# Patient Record
Sex: MALE | Race: WHITE | NOT HISPANIC OR LATINO | Employment: FULL TIME | URBAN - METROPOLITAN AREA
[De-identification: names, ages, dates, MRNs, and addresses within clinical notes are randomized per-mention and may not be internally consistent; named-entity substitution may affect disease eponyms.]

---

## 2019-03-11 ENCOUNTER — TELEPHONE (OUTPATIENT)
Dept: OPHTHALMOLOGY | Facility: CLINIC | Age: 24
End: 2019-03-11

## 2019-03-11 NOTE — TELEPHONE ENCOUNTER
----- Message from Terese Blas sent at 3/11/2019  4:28 PM CDT -----  Contact: Zander Akhtar calling to confirm if we received his office notes.  They are not in the phone room.  Perhaps they were picked up already.  Not sure.  Patient wants us to call him tomorrow to let him know if we have them or not.  [phone]

## 2019-03-13 ENCOUNTER — TELEPHONE (OUTPATIENT)
Dept: OPHTHALMOLOGY | Facility: CLINIC | Age: 24
End: 2019-03-13

## 2019-03-13 NOTE — TELEPHONE ENCOUNTER
Spoke to Marino, he wanted to know if we received his  referral.  I have not received it yet and gave an email he could send it to instead

## 2019-03-14 ENCOUNTER — TELEPHONE (OUTPATIENT)
Dept: OPHTHALMOLOGY | Facility: CLINIC | Age: 24
End: 2019-03-14

## 2019-03-14 ENCOUNTER — OFFICE VISIT (OUTPATIENT)
Dept: OPTOMETRY | Facility: CLINIC | Age: 24
End: 2019-03-14
Payer: OTHER GOVERNMENT

## 2019-03-14 ENCOUNTER — INITIAL CONSULT (OUTPATIENT)
Dept: OPHTHALMOLOGY | Facility: CLINIC | Age: 24
End: 2019-03-14
Payer: OTHER GOVERNMENT

## 2019-03-14 DIAGNOSIS — Z98.890 HISTORY OF STRABISMUS SURGERY: ICD-10-CM

## 2019-03-14 DIAGNOSIS — H52.203 HYPEROPIA WITH ASTIGMATISM, BILATERAL: Primary | ICD-10-CM

## 2019-03-14 DIAGNOSIS — H50.43 ACCOMMODATIVE ESOTROPIA: Primary | ICD-10-CM

## 2019-03-14 DIAGNOSIS — Z46.0 FITTING AND ADJUSTMENT OF SPECTACLES AND CONTACT LENSES: ICD-10-CM

## 2019-03-14 DIAGNOSIS — H52.03 HYPEROPIA WITH ASTIGMATISM, BILATERAL: Primary | ICD-10-CM

## 2019-03-14 PROCEDURE — 92310 CONTACT LENS FITTING OU: CPT | Mod: ,,, | Performed by: OPTOMETRIST

## 2019-03-14 PROCEDURE — 92060 SENSORIMOTOR EXAMINATION: CPT | Mod: PBBFAC | Performed by: OPHTHALMOLOGY

## 2019-03-14 PROCEDURE — 92310 PR CONTACT LENS FITTING (NO CHANGE): ICD-10-PCS | Mod: ,,, | Performed by: OPTOMETRIST

## 2019-03-14 PROCEDURE — 92004 PR EYE EXAM, NEW PATIENT,COMPREHESV: ICD-10-PCS | Mod: S$PBB,,, | Performed by: OPHTHALMOLOGY

## 2019-03-14 PROCEDURE — 99999 PR PBB SHADOW E&M-EST. PATIENT-LVL II: ICD-10-PCS | Mod: PBBFAC,,, | Performed by: OPHTHALMOLOGY

## 2019-03-14 PROCEDURE — 99212 OFFICE O/P EST SF 10 MIN: CPT | Mod: PBBFAC,27,25 | Performed by: OPTOMETRIST

## 2019-03-14 PROCEDURE — 92060 SENSORIMOTOR EXAMINATION: CPT | Mod: 26,S$PBB,, | Performed by: OPHTHALMOLOGY

## 2019-03-14 PROCEDURE — 99212 OFFICE O/P EST SF 10 MIN: CPT | Mod: PBBFAC | Performed by: OPHTHALMOLOGY

## 2019-03-14 PROCEDURE — 99999 PR PBB SHADOW E&M-EST. PATIENT-LVL II: CPT | Mod: PBBFAC,,, | Performed by: OPTOMETRIST

## 2019-03-14 PROCEDURE — 99999 PR PBB SHADOW E&M-EST. PATIENT-LVL II: CPT | Mod: PBBFAC,,, | Performed by: OPHTHALMOLOGY

## 2019-03-14 PROCEDURE — 92060 PR SPECIAL EYE EVAL,SENSORIMOTOR: ICD-10-PCS | Mod: 26,S$PBB,, | Performed by: OPHTHALMOLOGY

## 2019-03-14 PROCEDURE — 92004 COMPRE OPH EXAM NEW PT 1/>: CPT | Mod: S$PBB,,, | Performed by: OPHTHALMOLOGY

## 2019-03-14 PROCEDURE — 99999 PR PBB SHADOW E&M-EST. PATIENT-LVL II: ICD-10-PCS | Mod: PBBFAC,,, | Performed by: OPTOMETRIST

## 2019-03-14 NOTE — LETTER
March 14, 2019      PRIYA Dunham Jr., MD  [address]  [facility] [address]           [facility]  [address]  [facility] [address]  Phone: [phone]  Fax: [phone]          Patient: Zander Morales   MR Number: [id number]   YOB: 1995   Date of Visit: 3/14/2019       Dear Dr. PRIYA Dunham Jr.:    Thank you for referring Zander Morales to me for evaluation. Attached you will find relevant portions of my assessment and plan of care.    If you have questions, please do not hesitate to call me. I look forward to following Zander Morales along with you.    Sincerely,    Matthew Alvarado, OD    Enclosure  CC:  No Recipients    If you would like to receive this communication electronically, please contact [email or web address] or [phone] to request more information on [employer] Link access.    For providers and/or their staff who would like to refer a patient to [facility], please contact us through our one-stop-shop provider referral line, [facility], at [phone].    If you feel you have received this communication in error or would no longer like to receive these types of communications, please e-mail [email or web address]

## 2019-03-14 NOTE — PROGRESS NOTES
HPI     Patient is here for contact fit O U previous wearer.  Unsure of brand of previous lens.  strabismus OD     Last edited by Eddie Cervantes, PCT on 3/14/2019  9:34 AM. (History)        ROS     Negative for: Constitutional, Gastrointestinal, Neurological, Skin,   Genitourinary, Musculoskeletal, HENT, Endocrine, Cardiovascular, Eyes,   Respiratory, Psychiatric, Allergic/Imm, Heme/Lymph    Last edited by Matthew Alvarado, OD on 3/14/2019  9:35 AM. (History)        Assessment /Plan     For exam results, see Encounter Report.    Hyperopia with astigmatism, bilateral    Order CL. RTC CLFU at dispense.                 Contact lens exam done, see exam of same date for documentation.

## 2019-03-14 NOTE — PROGRESS NOTES
HPI     23 y.o old edouard is here for strabismus evaluation OD.  Strabismus Surgery OD 2001.  Patient sts crossing of eyes when removing glasses, OD is turning outward.    History of Recess R MR 5.5mm 3/27/02 with Dr. Ruelas    Last edited by PRIYA Dunham Jr., MD on 3/14/2019  8:29 AM. (History)              Assessment /Plan     For exam results, see Encounter Report.    Accommodative esotropia      Residual ET with glasses wear  In full correction, continue same specs   Can consider contact lens wear     Consider surgical correction to correct refractive error and Esotropia. The details of the surgical procedure were discussed. The risks of the procedure were identified and explained. Treatment alternatives were listed.      Procedure plan would be PRK and MR recession left eye.  Use of an adjustable suture to ensure a better outcome. 95% success rate with a 5% chance need for a repeat.  History of previous strabismus surgery to the right eye.     Edouard has decided to have a contact lens fit and then return for repeat measurements, will schedule strabismus surgery to correct the residual ET     This service was scribed by Myesha De Oliveira for, and in the presence of Dr Dunham who personally performed this service.    Myesha De Oliveira, COA    Arnulfo Dunham MD

## 2019-03-28 ENCOUNTER — TELEPHONE (OUTPATIENT)
Dept: OPTOMETRY | Facility: CLINIC | Age: 24
End: 2019-03-28

## 2019-03-28 NOTE — TELEPHONE ENCOUNTER
----- Message from Adis LOZA Stinger sent at 3/28/2019  8:48 AM CDT -----  Hello,    We have just received trial lenses for this pt.  Please call this pt to schedule a CLFU@P/U.    Thanks,  Esperanza

## 2019-04-02 ENCOUNTER — TELEPHONE (OUTPATIENT)
Dept: OPHTHALMOLOGY | Facility: CLINIC | Age: 24
End: 2019-04-02

## 2019-04-02 NOTE — TELEPHONE ENCOUNTER
04/02/19  Jamila made another attempt to reach pt and I did speak to pt today regarding his concerns about up-coming Sx and after Sx care answering all of his question about sx, after sx care (which he was told by me that I will be calling the day after Sx to check on him as well as review medication and PO care. Pt is requesting to speak with Myesha regarding some paperwork he made need from her. I sent Myesha a message to call pt regarding letter request. st 1:42p        ----- Message from Daniel Valle sent at 4/1/2019 12:37 PM CDT -----  Mr. Morales would like for Myesha to call him. He has questions about his upcoming surgery. He can be reached at [phone]

## 2019-04-04 ENCOUNTER — OFFICE VISIT (OUTPATIENT)
Dept: OPTOMETRY | Facility: CLINIC | Age: 24
End: 2019-04-04

## 2019-04-04 DIAGNOSIS — H52.03 HYPEROPIA WITH ASTIGMATISM, BILATERAL: Primary | ICD-10-CM

## 2019-04-04 DIAGNOSIS — H52.203 HYPEROPIA WITH ASTIGMATISM, BILATERAL: Primary | ICD-10-CM

## 2019-04-04 PROCEDURE — 92499 PR CONTACT LENS F/U LEV 1: ICD-10-PCS | Mod: ,,, | Performed by: OPTOMETRIST

## 2019-04-04 PROCEDURE — 92499 UNLISTED OPH SVC/PROCEDURE: CPT | Mod: ,,, | Performed by: OPTOMETRIST

## 2019-04-05 ENCOUNTER — TELEPHONE (OUTPATIENT)
Dept: OPHTHALMOLOGY | Facility: CLINIC | Age: 24
End: 2019-04-05

## 2019-04-05 NOTE — PROGRESS NOTES
HPI     Pt here to pickup CL and have f/u    Last edited by Matthew Alvarado, OD on 4/4/2019  4:43 PM. (History)        ROS     Negative for: Constitutional, Gastrointestinal, Neurological, Skin,   Genitourinary, Musculoskeletal, HENT, Endocrine, Cardiovascular, Eyes,   Respiratory, Psychiatric, Allergic/Imm, Heme/Lymph    Last edited by Matthew Alvarado, OD on 4/4/2019  4:43 PM. (History)        Assessment /Plan     For exam results, see Encounter Report.    Hyperopia with astigmatism, bilateral    New CLRx ordered. Pt has concerns about needing lenses before next week for his strab surgery workup with Dr Dunham. Pt informed that unfortunately, the lenses take 4 or more weeks to come in due to high cyl OD. Pt shows understanding. RTC [facility]FU at [facility]

## 2019-04-05 NOTE — TELEPHONE ENCOUNTER
04/05/19  Jamila returned Mr. Pichardo call and I have reviewed pt chart and I explained with pt that his ctl Rx will be fine and to keep his appt. stj 4:06p      ----- Message from Christel Beyer sent at 4/5/2019  2:25 PM CDT -----  Contact: Zander  Needs Advice    Reason for call: pt needed to speak with someone in the office about his contact lens.         Communication Preference: [phone]     Additional Information:

## 2019-04-16 NOTE — BRIEF OP NOTE
Brief Operative Note  Ophthalmology Service      Date of Procedure: 4/22/19     Attending Physician: PRIYA Dunham Jr., MD     Assistant: DENIA Hale MD    Pre-Operative Diagnosis: Accommodative esotropia [H50.43]     Post-Operative Diagnosis: Same as pre-operative diagnosis    Treatments/Procedures: Recess MR  To 13 mm from limbus w/adj OU    Intraoperative Findings: MR OD 10 mm from limbus; MR OS 8.0 mm from limbus    Anesthesia: General    Complications: None    Estimated Blood Loss: < 5 cc    Specimens: None    -------------------------------------------------------------  Full dictated Operative Report to follow.  -------------------------------------------------------------

## 2019-04-17 ENCOUNTER — TELEPHONE (OUTPATIENT)
Dept: OPTOMETRY | Facility: CLINIC | Age: 24
End: 2019-04-17

## 2019-04-18 ENCOUNTER — OFFICE VISIT (OUTPATIENT)
Dept: OPHTHALMOLOGY | Facility: CLINIC | Age: 24
End: 2019-04-18
Payer: OTHER GOVERNMENT

## 2019-04-18 DIAGNOSIS — Z98.890 HISTORY OF STRABISMUS SURGERY: Primary | ICD-10-CM

## 2019-04-18 DIAGNOSIS — H50.43 PARTIALLY ACCOMMODATIVE ESOTROPIA: ICD-10-CM

## 2019-04-18 PROCEDURE — 99999 PR PBB SHADOW E&M-EST. PATIENT-LVL II: ICD-10-PCS | Mod: PBBFAC,,, | Performed by: OPHTHALMOLOGY

## 2019-04-18 PROCEDURE — 92060 SENSORIMOTOR EXAMINATION: CPT | Mod: 26,S$PBB,, | Performed by: OPHTHALMOLOGY

## 2019-04-18 PROCEDURE — 92012 PR EYE EXAM, EST PATIENT,INTERMED: ICD-10-PCS | Mod: S$PBB,,, | Performed by: OPHTHALMOLOGY

## 2019-04-18 PROCEDURE — 92060 PR SPECIAL EYE EVAL,SENSORIMOTOR: ICD-10-PCS | Mod: 26,S$PBB,, | Performed by: OPHTHALMOLOGY

## 2019-04-18 PROCEDURE — 92060 SENSORIMOTOR EXAMINATION: CPT | Mod: PBBFAC | Performed by: OPHTHALMOLOGY

## 2019-04-18 PROCEDURE — 99999 PR PBB SHADOW E&M-EST. PATIENT-LVL II: CPT | Mod: PBBFAC,,, | Performed by: OPHTHALMOLOGY

## 2019-04-18 PROCEDURE — 92012 INTRM OPH EXAM EST PATIENT: CPT | Mod: S$PBB,,, | Performed by: OPHTHALMOLOGY

## 2019-04-18 PROCEDURE — 99212 OFFICE O/P EST SF 10 MIN: CPT | Mod: PBBFAC | Performed by: OPHTHALMOLOGY

## 2019-04-18 NOTE — PROGRESS NOTES
HPI     Patient is here for Pre-op visit , pt is wearing contact lens, not the   correct RX due to special RX.  (-)Flashes (-)Floaters  (-)Itch, (-)tear, (--)burn, (--)Dryness. (-) OTC Drops   (--)Photophobia  (-)Glare (-)diplopia (-) headaches          Last edited by ROSE Muller on 4/18/2019  8:30 AM. (History)            Assessment /Plan     For exam results, see Encounter Report.    History of strabismus surgery    Partially accommodative esotropia      Residual ET with glasses and contact lens    Ready for surgery on 4/22/19; target 35 ET  Advised to bring glasses to surgery     This service was scribed by Myesha De Oliveira for, and in the presence of Dr Dunham who personally performed this service.    Myesha De Oliveira, COA    Arnulfo Dunham MD

## 2019-04-19 NOTE — BRIEF OP NOTE
Discharge Summary  Ophthalmology Service      Admit Date: 4/22/19    Discharge Date: 4/22/19    Attending Physician: PRIYA Dunham Jr., MD     Discharge Physician: JUNI Dunham MD    Discharged Condition: Good    Reason for Admission: Accommodative esotropia [H50.43]     Treatments/Procedures: Strabismus Surgery (see dictated report for details).    Hospital Course: Stable, dictated    Consults: None    Significant Diagnostic Studies: None    Disposition: Home    Patient Instructions:   - Resume same diet as prior to surgery  - Resume activity as tolerated with no swimming for 1 week  - Apply ice packs to surgical eye(s) for 72 hours as tolerated  - Call the Ophthalmology clinic to schedule an appointment with Dr. Dunham in 4 week(s).    Patient Instructions:   Patient's Medications    No medications on file       No discharge procedures on file.

## 2019-04-22 ENCOUNTER — TELEPHONE (OUTPATIENT)
Dept: OPTOMETRY | Facility: CLINIC | Age: 24
End: 2019-04-22

## 2019-04-22 ENCOUNTER — HOSPITAL ENCOUNTER (OUTPATIENT)
Facility: HOSPITAL | Age: 24
Discharge: HOME | End: 2019-04-22
Attending: OPHTHALMOLOGY | Admitting: OPHTHALMOLOGY
Payer: OTHER GOVERNMENT

## 2019-04-22 ENCOUNTER — ANESTHESIA EVENT (OUTPATIENT)
Dept: SURGERY | Facility: HOSPITAL | Age: 24
End: 2019-04-22
Payer: OTHER GOVERNMENT

## 2019-04-22 ENCOUNTER — ANESTHESIA (OUTPATIENT)
Dept: SURGERY | Facility: HOSPITAL | Age: 24
End: 2019-04-22
Payer: OTHER GOVERNMENT

## 2019-04-22 VITALS
HEIGHT: 67 IN | HEART RATE: 71 BPM | TEMPERATURE: 98 F | DIASTOLIC BLOOD PRESSURE: 73 MMHG | WEIGHT: 155 LBS | OXYGEN SATURATION: 99 % | BODY MASS INDEX: 24.33 KG/M2 | SYSTOLIC BLOOD PRESSURE: 128 MMHG | RESPIRATION RATE: 28 BRPM

## 2019-04-22 DIAGNOSIS — H50.9 STRABISMUS: ICD-10-CM

## 2019-04-22 DIAGNOSIS — H50.43 PARTIALLY ACCOMMODATIVE ESOTROPIA: Primary | ICD-10-CM

## 2019-04-22 PROCEDURE — 37000008 HC ANESTHESIA 1ST 15 MINUTES: Performed by: OPHTHALMOLOGY

## 2019-04-22 PROCEDURE — 36000707: Performed by: OPHTHALMOLOGY

## 2019-04-22 PROCEDURE — D9220A PRA ANESTHESIA: ICD-10-PCS | Mod: ANES,,, | Performed by: ANESTHESIOLOGY

## 2019-04-22 PROCEDURE — D9220A PRA ANESTHESIA: Mod: ANES,,, | Performed by: ANESTHESIOLOGY

## 2019-04-22 PROCEDURE — 37000009 HC ANESTHESIA EA ADD 15 MINS: Performed by: OPHTHALMOLOGY

## 2019-04-22 PROCEDURE — 71000044 HC DOSC ROUTINE RECOVERY FIRST HOUR: Performed by: OPHTHALMOLOGY

## 2019-04-22 PROCEDURE — D9220A PRA ANESTHESIA: ICD-10-PCS | Mod: CRNA,,, | Performed by: NURSE ANESTHETIST, CERTIFIED REGISTERED

## 2019-04-22 PROCEDURE — 00140 ANES PROCEDURES ON EYE NOS: CPT | Performed by: OPHTHALMOLOGY

## 2019-04-22 PROCEDURE — 71000015 HC POSTOP RECOV 1ST HR: Performed by: OPHTHALMOLOGY

## 2019-04-22 PROCEDURE — 67332 REREVISE EYE MUSCLES ADD-ON: CPT | Mod: 50,,, | Performed by: OPHTHALMOLOGY

## 2019-04-22 PROCEDURE — 67311 PR STABISMUS SURG,ONE HORIZ MUSCLE: ICD-10-PCS | Mod: 50,,, | Performed by: OPHTHALMOLOGY

## 2019-04-22 PROCEDURE — 25000003 PHARM REV CODE 250: Performed by: OPHTHALMOLOGY

## 2019-04-22 PROCEDURE — 67334: ICD-10-PCS | Mod: 50,,, | Performed by: OPHTHALMOLOGY

## 2019-04-22 PROCEDURE — 63600175 PHARM REV CODE 636 W HCPCS: Performed by: ANESTHESIOLOGY

## 2019-04-22 PROCEDURE — 63600175 PHARM REV CODE 636 W HCPCS: Performed by: NURSE ANESTHETIST, CERTIFIED REGISTERED

## 2019-04-22 PROCEDURE — 36000706: Performed by: OPHTHALMOLOGY

## 2019-04-22 PROCEDURE — 67334 REVISE EYE MUSCLE W/SUTURE: CPT | Mod: 50,,, | Performed by: OPHTHALMOLOGY

## 2019-04-22 PROCEDURE — 67311 REVISE EYE MUSCLE: CPT | Mod: 50,,, | Performed by: OPHTHALMOLOGY

## 2019-04-22 PROCEDURE — D9220A PRA ANESTHESIA: Mod: CRNA,,, | Performed by: NURSE ANESTHETIST, CERTIFIED REGISTERED

## 2019-04-22 PROCEDURE — 67332 PR STABISMUS SURG,SCAR EXTRAOCUL MUSC: ICD-10-PCS | Mod: 50,,, | Performed by: OPHTHALMOLOGY

## 2019-04-22 RX ORDER — SODIUM CHLORIDE 0.9 % (FLUSH) 0.9 %
10 SYRINGE (ML) INJECTION
Status: DISCONTINUED | OUTPATIENT
Start: 2019-04-22 | End: 2019-04-22 | Stop reason: HOSPADM

## 2019-04-22 RX ORDER — NEOMYCIN SULFATE, POLYMYXIN B SULFATE, AND DEXAMETHASONE 3.5; 10000; 1 MG/G; [USP'U]/G; MG/G
OINTMENT OPHTHALMIC
Status: DISCONTINUED
Start: 2019-04-22 | End: 2019-04-22 | Stop reason: HOSPADM

## 2019-04-22 RX ORDER — MIDAZOLAM HYDROCHLORIDE 1 MG/ML
INJECTION, SOLUTION INTRAMUSCULAR; INTRAVENOUS
Status: DISCONTINUED | OUTPATIENT
Start: 2019-04-22 | End: 2019-04-22

## 2019-04-22 RX ORDER — SODIUM CHLORIDE 9 MG/ML
INJECTION, SOLUTION INTRAVENOUS CONTINUOUS
Status: DISCONTINUED | OUTPATIENT
Start: 2019-04-22 | End: 2019-04-22 | Stop reason: HOSPADM

## 2019-04-22 RX ORDER — PROPOFOL 10 MG/ML
VIAL (ML) INTRAVENOUS
Status: DISCONTINUED | OUTPATIENT
Start: 2019-04-22 | End: 2019-04-22

## 2019-04-22 RX ORDER — PHENYLEPHRINE HYDROCHLORIDE 25 MG/ML
SOLUTION/ DROPS OPHTHALMIC
Status: DISCONTINUED | OUTPATIENT
Start: 2019-04-22 | End: 2019-04-22 | Stop reason: HOSPADM

## 2019-04-22 RX ORDER — FENTANYL CITRATE 50 UG/ML
25 INJECTION, SOLUTION INTRAMUSCULAR; INTRAVENOUS EVERY 5 MIN PRN
Status: DISCONTINUED | OUTPATIENT
Start: 2019-04-22 | End: 2019-04-22 | Stop reason: HOSPADM

## 2019-04-22 RX ORDER — HYDROCODONE BITARTRATE AND ACETAMINOPHEN 5; 325 MG/1; MG/1
1 TABLET ORAL EVERY 4 HOURS PRN
Status: DISCONTINUED | OUTPATIENT
Start: 2019-04-22 | End: 2019-04-22 | Stop reason: HOSPADM

## 2019-04-22 RX ORDER — DEXAMETHASONE SODIUM PHOSPHATE 4 MG/ML
INJECTION, SOLUTION INTRA-ARTICULAR; INTRALESIONAL; INTRAMUSCULAR; INTRAVENOUS; SOFT TISSUE
Status: DISCONTINUED | OUTPATIENT
Start: 2019-04-22 | End: 2019-04-22

## 2019-04-22 RX ORDER — PHENYLEPHRINE HYDROCHLORIDE 25 MG/ML
SOLUTION/ DROPS OPHTHALMIC
Status: DISCONTINUED
Start: 2019-04-22 | End: 2019-04-22 | Stop reason: HOSPADM

## 2019-04-22 RX ORDER — ONDANSETRON 2 MG/ML
4 INJECTION INTRAMUSCULAR; INTRAVENOUS DAILY PRN
Status: DISCONTINUED | OUTPATIENT
Start: 2019-04-22 | End: 2019-04-22 | Stop reason: HOSPADM

## 2019-04-22 RX ORDER — NEOMYCIN SULFATE, POLYMYXIN B SULFATE, AND DEXAMETHASONE 3.5; 10000; 1 MG/G; [USP'U]/G; MG/G
OINTMENT OPHTHALMIC
Status: DISCONTINUED | OUTPATIENT
Start: 2019-04-22 | End: 2019-04-22 | Stop reason: HOSPADM

## 2019-04-22 RX ORDER — LIDOCAINE HCL/PF 100 MG/5ML
SYRINGE (ML) INTRAVENOUS
Status: DISCONTINUED | OUTPATIENT
Start: 2019-04-22 | End: 2019-04-22

## 2019-04-22 RX ORDER — ONDANSETRON 2 MG/ML
INJECTION INTRAMUSCULAR; INTRAVENOUS
Status: DISCONTINUED | OUTPATIENT
Start: 2019-04-22 | End: 2019-04-22

## 2019-04-22 RX ORDER — LIDOCAINE HYDROCHLORIDE 10 MG/ML
1 INJECTION, SOLUTION EPIDURAL; INFILTRATION; INTRACAUDAL; PERINEURAL ONCE
Status: DISCONTINUED | OUTPATIENT
Start: 2019-04-22 | End: 2019-04-22 | Stop reason: HOSPADM

## 2019-04-22 RX ADMIN — LIDOCAINE HYDROCHLORIDE 100 MG: 20 INJECTION, SOLUTION INTRAVENOUS at 09:04

## 2019-04-22 RX ADMIN — FENTANYL CITRATE 25 MCG: 50 INJECTION INTRAMUSCULAR; INTRAVENOUS at 11:04

## 2019-04-22 RX ADMIN — PROPOFOL 200 MG: 10 INJECTION, EMULSION INTRAVENOUS at 09:04

## 2019-04-22 RX ADMIN — SODIUM CHLORIDE: 0.9 INJECTION, SOLUTION INTRAVENOUS at 09:04

## 2019-04-22 RX ADMIN — PROPOFOL 50 MG: 10 INJECTION, EMULSION INTRAVENOUS at 09:04

## 2019-04-22 RX ADMIN — MIDAZOLAM HYDROCHLORIDE 2 MG: 1 INJECTION, SOLUTION INTRAMUSCULAR; INTRAVENOUS at 09:04

## 2019-04-22 RX ADMIN — DEXAMETHASONE SODIUM PHOSPHATE 4 MG: 4 INJECTION, SOLUTION INTRAMUSCULAR; INTRAVENOUS at 09:04

## 2019-04-22 RX ADMIN — ONDANSETRON 4 MG: 2 INJECTION INTRAMUSCULAR; INTRAVENOUS at 09:04

## 2019-04-22 RX ADMIN — HYDROCODONE BITARTRATE AND ACETAMINOPHEN 1 TABLET: 5; 325 TABLET ORAL at 11:04

## 2019-04-22 NOTE — PLAN OF CARE
Patient awake and alert. No complaints of pain . No complaints of nausea. Patient tolerated clear liquids.  Patient given and explained discharge instructions. Patient ready for discharge.

## 2019-04-22 NOTE — ANESTHESIA PREPROCEDURE EVALUATION
04/22/2019  Zander Morales is a 23 y.o., male.    Anesthesia Evaluation    I have reviewed the Patient Summary Reports.    I have reviewed the Nursing Notes.   I have reviewed the Medications.     Review of Systems  Anesthesia Hx:  No problems with previous Anesthesia  History of prior surgery of interest to airway management or planning: Denies Family Hx of Anesthesia complications.   Denies Personal Hx of Anesthesia complications.   Social:  Non-Smoker    Hematology/Oncology:  Hematology Normal   Oncology Normal     EENT/Dental:EENT/Dental Normal   Cardiovascular:  Cardiovascular Normal     Pulmonary:  Pulmonary Normal    Renal/:  Renal/ Normal     Hepatic/GI:  Hepatic/GI Normal    Musculoskeletal:  Musculoskeletal Normal    Neurological:  Neurology Normal    Endocrine:  Endocrine Normal    Psych:  Psychiatric Normal           Physical Exam  General:  Well nourished    Airway/Jaw/Neck:  Airway Findings: Mouth Opening: Normal Tongue: Normal  General Airway Assessment: Adult  Mallampati: I  TM Distance: Normal, at least 6 cm  Jaw/Neck Findings:  Neck ROM: Normal ROM      Dental:  Dental Findings: In tact   Chest/Lungs:  Chest/Lungs Findings: Clear to auscultation, Normal Respiratory Rate     Heart/Vascular:  Heart Findings: Rate: Normal  Rhythm: Regular Rhythm  Sounds: Normal        Mental Status:  Mental Status Findings:  Cooperative, Alert and Oriented         Anesthesia Plan  Type of Anesthesia, risks & benefits discussed:  Anesthesia Type:  general  Patient's Preference:   Intra-op Monitoring Plan: standard ASA monitors  Intra-op Monitoring Plan Comments:   Post Op Pain Control Plan: multimodal analgesia  Post Op Pain Control Plan Comments:   Induction:   IV  Beta Blocker:  Patient is not currently on a Beta-Blocker (No further documentation required).       Informed Consent: Patient understands  risks and agrees with Anesthesia plan.  Questions answered. Anesthesia consent signed with patient.  ASA Score: 1     Day of Surgery Review of History & Physical:    H&P update referred to the surgeon.         Ready For Surgery From Anesthesia Perspective.

## 2019-04-22 NOTE — DISCHARGE INSTRUCTIONS
Strabismus Surgery    The eye doctor may recommend strabismus surgery to help align your childs eyes. During surgery, certain eye muscles are adjusted. This helps the muscles better control how the eye moves. Often, surgery is done in addition to other treatments. In most cases, children who have strabismus surgery go home the same day.  How surgery works  Strabismus surgery is a safe, common procedure. The eye doctor simply changes the placement or length of an eye muscle. This small change can pull the eye into proper alignment. The 2 most common methods of surgery are:  · Recession. A muscle is moved to a new position on the eye.  · Resection. Part of an eye muscle is removed.  Before surgery  Prepare your child for the procedure as you have been instructed. In addition:  · A few days before surgery, your child may have an eye exam so the doctor can double-check eye measurements.  · Follow any directions your child is given for taking medicines and for not eating or drinking before surgery.  On the day of surgery, your child:  · Can wear favorite pajamas and bring along a toy.  · Will be given medicine (anesthesia) that makes him or her sleepy. Surgery wont start until your child is asleep.  After surgery  Each child reacts to surgery in his or her own way. Some children may be afraid to open their eyes at first. Children are often sleepy or cranky for several hours after surgery. If your childs response worries you, talk to the eye doctor. After surgery your child:  · May have a red eye. This will go away after several weeks.  · Will most likely not need any pain medicine. Recovering from strabismus surgery is not painful for most children.  · May still need other treatment, such as glasses or an eye patch.  When to call the doctor  Call your childs eye doctor if:  · Your childs eyelid is very swollen.  · A pus-like discharge comes from the eye. (A few bloody tears are normal.)  · Your child vomits more  than once.   Also call the doctor if your child has a fever, as directed by his or her healthcare provider, or:  · Your child is younger than 12 weeks and has a fever of 100.4°F (38°C) or higher. Your baby may need to be seen by his or her provider.  · Your child has repeated fevers above 104°F (40°C) at any age.  · Your child is younger than 2 years old and the fever lasts for more than 24 hours.  · Your child is 2 years old or older and the fever lasts for more than 3 days.  · Your child has had a seizure caused by the fever.  Risks and complications  As with any surgery, strabismus surgery has risks. These include:  · The eyes not being perfectly aligned. Some children need more surgery to adjust this.  · Bleeding in or around the eye  · Eye infection  · Risks of anesthesia (the eye doctor can tell you more about these)   Date Last Reviewed: 10/1/2016  © 8378-2913 [employer] [address] [place] [address], [place] [address]. All rights reserved. This information is not intended as a substitute for professional medical care. Always follow your healthcare professional's instructions.

## 2019-04-22 NOTE — DISCHARGE SUMMARY
Discharge Summary  Ophthalmology Service      Admit Date: 4/22/19    Discharge Date: 4/22/19    Attending Physician: PRIYA Dunham Jr., MD     Discharge Physician: JUNI Dunham MD    Discharged Condition: Good    Reason for Admission: Accommodative esotropia [H50.43]  Strabismus [H50.9]     Treatments/Procedures: Strabismus Surgery (see dictated report for details).    Hospital Course: Stable, dictated    Consults: None    Significant Diagnostic Studies: None    Disposition: Home    Patient Instructions:   - Resume same diet as prior to surgery  - Resume activity as tolerated with no swimming for 1 week  - Apply ice packs to surgical eye(s) for 72 hours as tolerated  - Call the Ophthalmology clinic to schedule an appointment with Dr. Dunham in 4 week(s).    Patient Instructions:   There are no discharge medications for this patient.      No discharge procedures on file.

## 2019-04-22 NOTE — H&P
Pre-Operative History & Physical  Ophthalmology      SUBJECTIVE:     History of Present Illness:  Patient is a 23 y.o. male presents with Accommodative esotropia [H50.43].    MEDICATIONS:   No medications prior to admission.       ALLERGIES: Review of patient's allergies indicates:  No Known Allergies    PAST MEDICAL HISTORY:   Past Medical History:   Diagnosis Date    Strabismus      PAST SURGICAL HISTORY:   Past Surgical History:   Procedure Laterality Date    STRABISMUS SURGERY Right 03/27/2002    recess MR 5.5mm     PAST FAMILY HISTORY: No family history on file.  SOCIAL HISTORY:   Social History     Tobacco Use    Smoking status: Never Smoker   Substance Use Topics    Alcohol use: Not on file    Drug use: Not on file        MENTAL STATUS: Alert    REVIEW OF SYSTEMS: Negative    OBJECTIVE:     Vital Signs (Most Recent)       Physical Exam:  General: NAD  HEENT: Strabismus, Atraumatic  Lungs: Adequate respirations  Heart: + pulses  Abdomen: Soft    ASSESSMENT/PLAN:     Patient is a 23 y.o. male with Accommodative esotropia [H50.43].     - Plan for surgical correction Strab sx   - Risks/benefits/alternatives of the procedure including, but not limited to scarring, bleeding, infection, loss or decreased vision, and/or need for possible repeat surgery discussed with the patient and family.   - Informed consent obtained prior to surgery and the patient/family voiced good understanding.

## 2019-04-22 NOTE — OP NOTE
DATE OF PROCEDURE:  04/22/2019.    SURGEON:  PRIYA Dunham M.D.    ASSISTANT:  None.    PREOPERATIVE DIAGNOSIS:  Strabismus -- esotropia; reoperation.    POSTOPERATIVE DIAGNOSIS:  Strabismus -- esotropia; reoperation.    PROCEDURE:  Recession, medial rectus, both eyes to 13 mm from the limbus with   adjustable sutures, both eyes.    COMPLICATIONS:  None.    BLOOD LOSS:  Less than 2 mL    PROCEDURE IN DETAIL:  The patient was brought to the Operating Suite where   general intubation anesthesia was achieved.  Both eyes were prepped and draped   in sterile fashion, lid speculum placed in the left eye.  Through an inferior   nasal fornix incision, the medial rectus muscle was identified and placed on a   muscle hook.  It was found to have been recessed 8 mm from the limbus.  The   scarring between the underside of the conjunctiva and sclera were sharply   dissected.  A double-armed 6-0 Vicryl suture was placed through the muscle   belly, 1 mm posterior to the insertion.  Locked bites were placed in the middle,   upper and lower edge of the muscle.  The muscle was disinserted from the globe   and the two ends of double-armed suture passed through the sclera 6.5 mm from   the limbus.  A noose suture was placed around the muscle suture and positioned   6.5 mm distally.  The muscle was then allowed to retract back to 13 mm from the   limbus.  The conjunctiva was reapproximated and the sutures were buried.    Attention was directed to the right eye where a similar procedure was performed.    On this side, the muscle was found to have been recessed to 10 mm from the   limbus.  An adjustable suture was also used and the muscle allowed to retract at   15 mm from the limbus.  Betadine solution and Maxitrol ointment were placed in   the eye.  The patient was then brought to the Recovery Room in good condition.      HE/HN  dd: 04/22/2019 10:32:38 (CDT)  td: 04/22/2019 18:16:51 (CDT)  Doc ID   #[id number]  Job ID #[id number]    CC:

## 2019-04-22 NOTE — TRANSFER OF CARE
"Anesthesia Transfer of Care Note    Patient: Zander Morales    Procedure(s) Performed: Procedure(s) (LRB):  STRABISMUS SURGERY/with adjustable sutures (Bilateral)    Patient location: PACU    Anesthesia Type: general    Transport from OR: Transported from OR on 6-10 L/min O2 by face mask with adequate spontaneous ventilation    Post pain: adequate analgesia    Post assessment: no apparent anesthetic complications    Post vital signs: stable    Level of consciousness: responds to stimulation    Nausea/Vomiting: no nausea/vomiting    Complications: none    Transfer of care protocol was followed      Last vitals:   Visit Vitals  /68 (BP Location: Left arm, Patient Position: Lying)   Pulse 62   Temp 36.9 °C (98.4 °F) (Oral)   Resp 18   Ht 5' 7" (1.702 m)   Wt 70.3 kg (155 lb)   SpO2 100%   BMI 24.28 kg/m²     "

## 2019-04-23 NOTE — ANESTHESIA POSTPROCEDURE EVALUATION
Anesthesia Post Evaluation    Patient: Zander Morales    Procedure(s) Performed: Procedure(s) (LRB):  STRABISMUS SURGERY/with adjustable sutures (Bilateral)    Final Anesthesia Type: general  Patient location during evaluation: PACU  Patient participation: Yes- Able to Participate  Level of consciousness: awake and alert  Post-procedure vital signs: reviewed and stable  Pain management: adequate  Airway patency: patent  PONV status at discharge: No PONV  Anesthetic complications: no      Cardiovascular status: blood pressure returned to baseline  Respiratory status: unassisted, spontaneous ventilation and room air  Hydration status: euvolemic  Follow-up not needed.          Vitals Value Taken Time   /73 4/22/2019 11:46 AM   Temp 36.6 °C (97.8 °F) 4/22/2019 10:34 AM   Pulse 70 4/22/2019 11:47 AM   Resp 28 4/22/2019 11:47 AM   SpO2 100 % 4/22/2019 11:47 AM   Vitals shown include unvalidated device data.      No case tracking events are documented in the log.      Pain/Camila Score: Pain Rating Prior to Med Admin: 5 (4/22/2019 11:40 AM)  Camila Score: 10 (4/22/2019 11:30 AM)

## 2019-04-29 ENCOUNTER — TELEPHONE (OUTPATIENT)
Dept: OPHTHALMOLOGY | Facility: CLINIC | Age: 24
End: 2019-04-29

## 2019-04-29 NOTE — TELEPHONE ENCOUNTER
----- Message from Daniel Valle sent at 4/29/2019  8:13 AM CDT -----  Contact: Zander  Mr. Morales says that he had surgery on last Monday. He says that with glasses on his vision great, and with his glasses off his eye starts to cross again but when he is close up his vision fine. Mr. Morales wants to know if that is normal. He notice the problem on last Friday. He can be reached at [phone] if he isn't at his desk please leave a message. He also wants to know when will it be safe for him to wear contacts.

## 2019-04-29 NOTE — TELEPHONE ENCOUNTER
Spoke to Marino and reviewed about what to expect after strabismus surgery.  ET without glasses wear is to be expected after the surgery due to the high hyperopia.

## 2019-05-02 ENCOUNTER — OFFICE VISIT (OUTPATIENT)
Dept: OPTOMETRY | Facility: CLINIC | Age: 24
End: 2019-05-02
Payer: OTHER GOVERNMENT

## 2019-05-02 DIAGNOSIS — H52.203 HYPEROPIA WITH ASTIGMATISM, BILATERAL: Primary | ICD-10-CM

## 2019-05-02 DIAGNOSIS — H52.03 HYPEROPIA WITH ASTIGMATISM, BILATERAL: Primary | ICD-10-CM

## 2019-05-02 PROCEDURE — 92499 UNLISTED OPH SVC/PROCEDURE: CPT | Mod: ,,, | Performed by: OPTOMETRIST

## 2019-05-02 PROCEDURE — 92499 PR CONTACT LENS F/U LEV 1: ICD-10-PCS | Mod: ,,, | Performed by: OPTOMETRIST

## 2019-05-02 NOTE — LETTER
May 3, 2019      PRIYA Dunham Jr., MD  [address]  [facility] [address]           [facility]  [address]  [facility] [address]  Phone: [phone]  Fax: [phone]          Patient: Zander Morales   MR Number: [id number]   YOB: 1995   Date of Visit: 5/2/2019       Dear Dr. PRIYA Dunham Jr.:    Thank you for referring Zander Morales to me for evaluation. Attached you will find relevant portions of my assessment and plan of care.    If you have questions, please do not hesitate to call me. I look forward to following Zander Morales along with you.    Sincerely,    Matthew Alvarado, OD    Enclosure  CC:  No Recipients    If you would like to receive this communication electronically, please contact [email or web address] or [phone] to request more information on [employer] Link access.    For providers and/or their staff who would like to refer a patient to [facility], please contact us through our one-stop-shop provider referral line, [facility], at [phone].    If you feel you have received this communication in error or would no longer like to receive these types of communications, please e-mail [email or web address]

## 2019-05-03 NOTE — PROGRESS NOTES
HPI     Pt states that OD is better than last rx. OS still is blurry     Last edited by Sussy Jason on 5/2/2019  4:26 PM. (History)        ROS     Negative for: Constitutional, Gastrointestinal, Neurological, Skin,   Genitourinary, Musculoskeletal, HENT, Endocrine, Cardiovascular, Eyes,   Respiratory, Psychiatric, Allergic/Imm, Heme/Lymph    Last edited by Matthew Alvarado, OD on 5/2/2019  4:33 PM. (History)        Assessment /Plan     For exam results, see Encounter Report.    Hyperopia with astigmatism, bilateral      Order new CL. [facility]

## 2019-05-23 ENCOUNTER — OFFICE VISIT (OUTPATIENT)
Dept: OPHTHALMOLOGY | Facility: CLINIC | Age: 24
End: 2019-05-23
Payer: OTHER GOVERNMENT

## 2019-05-23 DIAGNOSIS — Z98.890 POST-OPERATIVE STATE: Primary | ICD-10-CM

## 2019-05-23 PROCEDURE — 99024 POSTOP FOLLOW-UP VISIT: CPT | Mod: ,,, | Performed by: OPHTHALMOLOGY

## 2019-05-23 PROCEDURE — 99999 PR PBB SHADOW E&M-EST. PATIENT-LVL II: CPT | Mod: PBBFAC,,, | Performed by: OPHTHALMOLOGY

## 2019-05-23 PROCEDURE — 99999 PR PBB SHADOW E&M-EST. PATIENT-LVL II: ICD-10-PCS | Mod: PBBFAC,,, | Performed by: OPHTHALMOLOGY

## 2019-05-23 PROCEDURE — 99212 OFFICE O/P EST SF 10 MIN: CPT | Mod: PBBFAC | Performed by: OPHTHALMOLOGY

## 2019-05-23 PROCEDURE — 99024 PR POST-OP FOLLOW-UP VISIT: ICD-10-PCS | Mod: ,,, | Performed by: OPHTHALMOLOGY

## 2019-05-23 NOTE — PROGRESS NOTES
HPI     DLS 04/18/19 by Dr. Charla MD    24 YO M here today for his 1 mo PO Strabismus Repair ck. Pt reports' no   complications and seeing well. Pt is please with the outcome of his sx.   stj     -eye pain  -diplopia  -blurred vision    POHx:   1. Esotropia OU  S/P Recession , MR OU to 13 mm from limbus w/ adjustable sutures   OU(reoperation) 04/22/19 by Dr Charla MD      Last edited by Jamila Morel MA on 5/23/2019  8:20 AM. (History)        ROS     Positive for: Eyes    Last edited by PRIYA Dunham Jr., MD on 5/23/2019  8:27 AM. (History)          Assessment /Plan     For exam results, see Encounter Report.    Post-operative state      The patient has had the desired result of the surgical procedure.   RTC PRN     This service was scribed by Myesha De Oliveira for, and in the presence of Dr Dunham who personally performed this service.    Myesha De Oliveira, COA    Arnulfo Dunham MD

## 2019-05-30 ENCOUNTER — TELEPHONE (OUTPATIENT)
Dept: OPTOMETRY | Facility: CLINIC | Age: 24
End: 2019-05-30

## 2019-05-30 NOTE — TELEPHONE ENCOUNTER
Call to let pt know that lenses are in. Scheduled appt . ----- Message from Alexa Kong MA sent at 5/30/2019 10:02 AM CDT -----  Got lenses in, hes ready for an appt to be scheduled.

## 2019-06-06 ENCOUNTER — OFFICE VISIT (OUTPATIENT)
Dept: OPTOMETRY | Facility: CLINIC | Age: 24
End: 2019-06-06
Payer: OTHER GOVERNMENT

## 2019-06-06 DIAGNOSIS — H52.203 HYPEROPIA WITH ASTIGMATISM, BILATERAL: Primary | ICD-10-CM

## 2019-06-06 DIAGNOSIS — H52.03 HYPEROPIA WITH ASTIGMATISM, BILATERAL: Primary | ICD-10-CM

## 2019-06-06 PROCEDURE — 92499 UNLISTED OPH SVC/PROCEDURE: CPT | Mod: ,,, | Performed by: OPTOMETRIST

## 2019-06-06 PROCEDURE — 92499 PR CONTACT LENS F/U LEV 1: ICD-10-PCS | Mod: ,,, | Performed by: OPTOMETRIST

## 2019-06-07 NOTE — PROGRESS NOTES
HPI     Pt is in for [facility]     Last edited by Sussy Jason on 6/6/2019  4:29 PM. (History)        ROS     Negative for: Constitutional, Gastrointestinal, Neurological, Skin,   Genitourinary, Musculoskeletal, HENT, Endocrine, Cardiovascular, Eyes,   Respiratory, Psychiatric, Allergic/Imm, Heme/Lymph    Last edited by Matthew Alvarado, OD on 6/6/2019  4:38 PM. (History)        Assessment /Plan     For exam results, see Encounter Report.    Hyperopia with astigmatism, bilateral      Good fit and vision w/CL. RTC 1 year Routine exam. Pt reports he was released from Dr Dunham.

## 2020-08-17 ENCOUNTER — TELEPHONE (OUTPATIENT)
Dept: OPHTHALMOLOGY | Facility: CLINIC | Age: 25
End: 2020-08-17

## 2021-04-13 ENCOUNTER — OFFICE VISIT (OUTPATIENT)
Dept: OPTOMETRY | Facility: CLINIC | Age: 26
End: 2021-04-13
Payer: OTHER GOVERNMENT

## 2021-04-13 DIAGNOSIS — Z46.0 CONTACT LENS/GLASSES FITTING: Primary | ICD-10-CM

## 2021-04-13 DIAGNOSIS — H52.7 REFRACTIVE ERROR: ICD-10-CM

## 2021-04-13 DIAGNOSIS — Z01.00 EXAMINATION OF EYES AND VISION: Primary | ICD-10-CM

## 2021-04-13 PROCEDURE — 92310 CONTACT LENS FITTING OU: CPT | Mod: ,,, | Performed by: OPTOMETRIST

## 2021-04-13 PROCEDURE — 99999 PR PBB SHADOW E&M-EST. PATIENT-LVL II: CPT | Mod: PBBFAC,,, | Performed by: OPTOMETRIST

## 2021-04-13 PROCEDURE — 99499 NO LOS: ICD-10-PCS | Mod: S$PBB,,, | Performed by: OPTOMETRIST

## 2021-04-13 PROCEDURE — 99499 UNLISTED E&M SERVICE: CPT | Mod: S$PBB,,, | Performed by: OPTOMETRIST

## 2021-04-13 PROCEDURE — 99212 OFFICE O/P EST SF 10 MIN: CPT | Mod: PBBFAC,PO | Performed by: OPTOMETRIST

## 2021-04-13 PROCEDURE — 99212 OFFICE O/P EST SF 10 MIN: CPT | Mod: PBBFAC,27,PO | Performed by: OPTOMETRIST

## 2021-04-13 PROCEDURE — 92015 PR REFRACTION: ICD-10-PCS | Mod: ,,, | Performed by: OPTOMETRIST

## 2021-04-13 PROCEDURE — 92014 COMPRE OPH EXAM EST PT 1/>: CPT | Mod: S$PBB,,, | Performed by: OPTOMETRIST

## 2021-04-13 PROCEDURE — 99999 PR PBB SHADOW E&M-EST. PATIENT-LVL II: ICD-10-PCS | Mod: PBBFAC,,, | Performed by: OPTOMETRIST

## 2021-04-13 PROCEDURE — 92014 PR EYE EXAM, EST PATIENT,COMPREHESV: ICD-10-PCS | Mod: S$PBB,,, | Performed by: OPTOMETRIST

## 2021-04-13 PROCEDURE — 92310 PR CONTACT LENS FITTING (NO CHANGE): ICD-10-PCS | Mod: ,,, | Performed by: OPTOMETRIST

## 2021-04-13 PROCEDURE — 92015 DETERMINE REFRACTIVE STATE: CPT | Mod: ,,, | Performed by: OPTOMETRIST

## 2021-05-12 ENCOUNTER — PATIENT MESSAGE (OUTPATIENT)
Dept: RESEARCH | Facility: HOSPITAL | Age: 26
End: 2021-05-12

## 2021-05-20 ENCOUNTER — OFFICE VISIT (OUTPATIENT)
Dept: OPTOMETRY | Facility: CLINIC | Age: 26
End: 2021-05-20
Payer: OTHER GOVERNMENT

## 2021-05-20 DIAGNOSIS — Z46.0 CONTACT LENS/GLASSES FITTING: Primary | ICD-10-CM

## 2021-05-20 PROCEDURE — 99499 UNLISTED E&M SERVICE: CPT | Mod: S$PBB,,, | Performed by: OPTOMETRIST

## 2021-05-20 PROCEDURE — 99499 NO LOS: ICD-10-PCS | Mod: S$PBB,,, | Performed by: OPTOMETRIST

## 2021-07-12 ENCOUNTER — TELEPHONE (OUTPATIENT)
Dept: OPTOMETRY | Facility: CLINIC | Age: 26
End: 2021-07-12

## (undated) DEVICE — SOL BETADINE 5%

## (undated) DEVICE — TRAY MUSCLE LID EYE

## (undated) DEVICE — FORCEP CURVED DISP

## (undated) DEVICE — CORD BIPOLAR 12 FOOT

## (undated) DEVICE — SHEET EENT SPLIT

## (undated) DEVICE — SUT 6/0 18IN COATED VICRYL

## (undated) DEVICE — DRESSING TRANS 2X2 TEGADERM

## (undated) DEVICE — SEE MEDLINE ITEM 157128